# Patient Record
Sex: FEMALE
[De-identification: names, ages, dates, MRNs, and addresses within clinical notes are randomized per-mention and may not be internally consistent; named-entity substitution may affect disease eponyms.]

---

## 2023-07-10 ENCOUNTER — NURSE TRIAGE (OUTPATIENT)
Dept: OTHER | Facility: CLINIC | Age: 53
End: 2023-07-10

## 2023-07-10 NOTE — TELEPHONE ENCOUNTER
Location of patient: [place]    Subjective: Caller states \"I have had a cough since April 1st.\"     Current Symptoms: non productive cough    Speaking in complete sentences    Onset: 3 months ago;       Pain Severity: 0/10; Temperature: denies     What has been tried: mucinex, cough suprresant     Denies - shortness of breath / wheeze      Recommended disposition: See PCP within 3 Days    Care advice provided, patient verbalizes understanding; denies any other questions or concerns; instructed to call back for any new or worsening symptoms. Patient/caller agrees to follow-up with PCP     This triage is a result of a call to [address] [place] [address]. Please do not respond to the triage nurse through this encounter. Any subsequent communication should be directly with the patient.     Reason for Disposition   Cough has been present for > 3 weeks    Protocols used: Cough-ADULT-[place]